# Patient Record
Sex: MALE | Race: BLACK OR AFRICAN AMERICAN | NOT HISPANIC OR LATINO | URBAN - METROPOLITAN AREA
[De-identification: names, ages, dates, MRNs, and addresses within clinical notes are randomized per-mention and may not be internally consistent; named-entity substitution may affect disease eponyms.]

---

## 2023-03-14 ENCOUNTER — EMERGENCY (EMERGENCY)
Facility: HOSPITAL | Age: 38
LOS: 1 days | Discharge: HOME | End: 2023-03-14
Attending: EMERGENCY MEDICINE
Payer: COMMERCIAL

## 2023-03-14 VITALS
OXYGEN SATURATION: 98 % | SYSTOLIC BLOOD PRESSURE: 123 MMHG | DIASTOLIC BLOOD PRESSURE: 69 MMHG | HEIGHT: 65 IN | HEART RATE: 66 BPM | TEMPERATURE: 98 F | RESPIRATION RATE: 16 BRPM | WEIGHT: 169.98 LBS

## 2023-03-14 DIAGNOSIS — F20.9 SCHIZOPHRENIA, UNSPECIFIED: ICD-10-CM

## 2023-03-14 LAB
ALBUMIN SERPL ELPH-MCNC: 4.5 G/DL — SIGNIFICANT CHANGE UP (ref 3.3–5.2)
ALP SERPL-CCNC: 64 U/L — SIGNIFICANT CHANGE UP (ref 40–120)
ALT FLD-CCNC: 20 U/L — SIGNIFICANT CHANGE UP
AMPHET UR-MCNC: NEGATIVE — SIGNIFICANT CHANGE UP
ANION GAP SERPL CALC-SCNC: 10 MMOL/L — SIGNIFICANT CHANGE UP (ref 5–17)
APAP SERPL-MCNC: <3 UG/ML — LOW (ref 10–26)
APPEARANCE UR: CLEAR — SIGNIFICANT CHANGE UP
AST SERPL-CCNC: 22 U/L — SIGNIFICANT CHANGE UP
BACTERIA # UR AUTO: ABNORMAL
BARBITURATES UR SCN-MCNC: NEGATIVE — SIGNIFICANT CHANGE UP
BASOPHILS # BLD AUTO: 0.03 K/UL — SIGNIFICANT CHANGE UP (ref 0–0.2)
BASOPHILS NFR BLD AUTO: 0.6 % — SIGNIFICANT CHANGE UP (ref 0–2)
BENZODIAZ UR-MCNC: NEGATIVE — SIGNIFICANT CHANGE UP
BILIRUB SERPL-MCNC: 0.4 MG/DL — SIGNIFICANT CHANGE UP (ref 0.4–2)
BILIRUB UR-MCNC: NEGATIVE — SIGNIFICANT CHANGE UP
BUN SERPL-MCNC: 11.9 MG/DL — SIGNIFICANT CHANGE UP (ref 8–20)
CALCIUM SERPL-MCNC: 9.4 MG/DL — SIGNIFICANT CHANGE UP (ref 8.4–10.5)
CHLORIDE SERPL-SCNC: 104 MMOL/L — SIGNIFICANT CHANGE UP (ref 96–108)
CO2 SERPL-SCNC: 27 MMOL/L — SIGNIFICANT CHANGE UP (ref 22–29)
COCAINE METAB.OTHER UR-MCNC: NEGATIVE — SIGNIFICANT CHANGE UP
COLOR SPEC: YELLOW — SIGNIFICANT CHANGE UP
CREAT SERPL-MCNC: 1 MG/DL — SIGNIFICANT CHANGE UP (ref 0.5–1.3)
DIFF PNL FLD: NEGATIVE — SIGNIFICANT CHANGE UP
EGFR: 100 ML/MIN/1.73M2 — SIGNIFICANT CHANGE UP
EOSINOPHIL # BLD AUTO: 0.12 K/UL — SIGNIFICANT CHANGE UP (ref 0–0.5)
EOSINOPHIL NFR BLD AUTO: 2.4 % — SIGNIFICANT CHANGE UP (ref 0–6)
EPI CELLS # UR: SIGNIFICANT CHANGE UP
ETHANOL SERPL-MCNC: <10 MG/DL — SIGNIFICANT CHANGE UP (ref 0–9)
GLUCOSE SERPL-MCNC: 85 MG/DL — SIGNIFICANT CHANGE UP (ref 70–99)
GLUCOSE UR QL: NEGATIVE MG/DL — SIGNIFICANT CHANGE UP
HCT VFR BLD CALC: 44.5 % — SIGNIFICANT CHANGE UP (ref 39–50)
HGB BLD-MCNC: 14.8 G/DL — SIGNIFICANT CHANGE UP (ref 13–17)
IMM GRANULOCYTES NFR BLD AUTO: 0.2 % — SIGNIFICANT CHANGE UP (ref 0–0.9)
KETONES UR-MCNC: NEGATIVE — SIGNIFICANT CHANGE UP
LEUKOCYTE ESTERASE UR-ACNC: ABNORMAL
LYMPHOCYTES # BLD AUTO: 1.42 K/UL — SIGNIFICANT CHANGE UP (ref 1–3.3)
LYMPHOCYTES # BLD AUTO: 28.1 % — SIGNIFICANT CHANGE UP (ref 13–44)
MCHC RBC-ENTMCNC: 29.9 PG — SIGNIFICANT CHANGE UP (ref 27–34)
MCHC RBC-ENTMCNC: 33.3 GM/DL — SIGNIFICANT CHANGE UP (ref 32–36)
MCV RBC AUTO: 89.9 FL — SIGNIFICANT CHANGE UP (ref 80–100)
METHADONE UR-MCNC: NEGATIVE — SIGNIFICANT CHANGE UP
MONOCYTES # BLD AUTO: 0.37 K/UL — SIGNIFICANT CHANGE UP (ref 0–0.9)
MONOCYTES NFR BLD AUTO: 7.3 % — SIGNIFICANT CHANGE UP (ref 2–14)
NEUTROPHILS # BLD AUTO: 3.11 K/UL — SIGNIFICANT CHANGE UP (ref 1.8–7.4)
NEUTROPHILS NFR BLD AUTO: 61.4 % — SIGNIFICANT CHANGE UP (ref 43–77)
NITRITE UR-MCNC: NEGATIVE — SIGNIFICANT CHANGE UP
OPIATES UR-MCNC: NEGATIVE — SIGNIFICANT CHANGE UP
PCP SPEC-MCNC: SIGNIFICANT CHANGE UP
PCP UR-MCNC: NEGATIVE — SIGNIFICANT CHANGE UP
PH UR: 6 — SIGNIFICANT CHANGE UP (ref 5–8)
PLATELET # BLD AUTO: 233 K/UL — SIGNIFICANT CHANGE UP (ref 150–400)
POTASSIUM SERPL-MCNC: 3.9 MMOL/L — SIGNIFICANT CHANGE UP (ref 3.5–5.3)
POTASSIUM SERPL-SCNC: 3.9 MMOL/L — SIGNIFICANT CHANGE UP (ref 3.5–5.3)
PROT SERPL-MCNC: 6.9 G/DL — SIGNIFICANT CHANGE UP (ref 6.6–8.7)
PROT UR-MCNC: 15
RBC # BLD: 4.95 M/UL — SIGNIFICANT CHANGE UP (ref 4.2–5.8)
RBC # FLD: 12.4 % — SIGNIFICANT CHANGE UP (ref 10.3–14.5)
RBC CASTS # UR COMP ASSIST: NEGATIVE /HPF — SIGNIFICANT CHANGE UP (ref 0–4)
SALICYLATES SERPL-MCNC: <0.6 MG/DL — LOW (ref 10–20)
SARS-COV-2 RNA SPEC QL NAA+PROBE: SIGNIFICANT CHANGE UP
SODIUM SERPL-SCNC: 141 MMOL/L — SIGNIFICANT CHANGE UP (ref 135–145)
SP GR SPEC: 1.01 — SIGNIFICANT CHANGE UP (ref 1.01–1.02)
THC UR QL: NEGATIVE — SIGNIFICANT CHANGE UP
TSH SERPL-MCNC: 1.3 UIU/ML — SIGNIFICANT CHANGE UP (ref 0.27–4.2)
UROBILINOGEN FLD QL: NEGATIVE MG/DL — SIGNIFICANT CHANGE UP
WBC # BLD: 5.06 K/UL — SIGNIFICANT CHANGE UP (ref 3.8–10.5)
WBC # FLD AUTO: 5.06 K/UL — SIGNIFICANT CHANGE UP (ref 3.8–10.5)
WBC UR QL: SIGNIFICANT CHANGE UP /HPF (ref 0–5)

## 2023-03-14 PROCEDURE — 93010 ELECTROCARDIOGRAM REPORT: CPT

## 2023-03-14 PROCEDURE — 99223 1ST HOSP IP/OBS HIGH 75: CPT

## 2023-03-14 PROCEDURE — 90792 PSYCH DIAG EVAL W/MED SRVCS: CPT

## 2023-03-14 RX ORDER — METFORMIN HYDROCHLORIDE 850 MG/1
1 TABLET ORAL
Qty: 30 | Refills: 0
Start: 2023-03-14 | End: 2023-04-12

## 2023-03-14 RX ORDER — ARIPIPRAZOLE 15 MG/1
20 TABLET ORAL ONCE
Refills: 0 | Status: COMPLETED | OUTPATIENT
Start: 2023-03-14 | End: 2023-03-14

## 2023-03-14 RX ORDER — METFORMIN HYDROCHLORIDE 850 MG/1
500 TABLET ORAL ONCE
Refills: 0 | Status: COMPLETED | OUTPATIENT
Start: 2023-03-14 | End: 2023-03-14

## 2023-03-14 RX ORDER — ARIPIPRAZOLE 15 MG/1
1 TABLET ORAL
Qty: 30 | Refills: 0
Start: 2023-03-14 | End: 2023-04-12

## 2023-03-14 RX ORDER — METFORMIN HYDROCHLORIDE 850 MG/1
500 TABLET ORAL DAILY
Refills: 0 | Status: DISCONTINUED | OUTPATIENT
Start: 2023-03-14 | End: 2023-03-21

## 2023-03-14 RX ORDER — ARIPIPRAZOLE 15 MG/1
20 TABLET ORAL DAILY
Refills: 0 | Status: DISCONTINUED | OUTPATIENT
Start: 2023-03-14 | End: 2023-03-21

## 2023-03-14 RX ADMIN — METFORMIN HYDROCHLORIDE 500 MILLIGRAM(S): 850 TABLET ORAL at 13:06

## 2023-03-14 RX ADMIN — ARIPIPRAZOLE 20 MILLIGRAM(S): 15 TABLET ORAL at 13:06

## 2023-03-14 RX ADMIN — METFORMIN HYDROCHLORIDE 500 MILLIGRAM(S): 850 TABLET ORAL at 21:55

## 2023-03-14 RX ADMIN — ARIPIPRAZOLE 20 MILLIGRAM(S): 15 TABLET ORAL at 22:06

## 2023-03-14 NOTE — ED ADULT NURSE NOTE - ED STAT RN HANDOFF DETAILS
Report given to  RN Kalyan. Pt brought to  interview room with [facility] ambulatory with steady gait. Respirations even & unlabored. NAD. Belongings brought to back by [facility]. Pt made aware of plan of care and verbalized understanding.

## 2023-03-14 NOTE — ED ADULT NURSE REASSESSMENT NOTE - NS ED NURSE REASSESS COMMENT FT1
Endorse patient care at 7:00 PM. Patient in the community area socializing with others. Patient denied visual and auditory hallucination, denied suicidal thoughts, patient wearing bracelet to left foot, patient complies with nursing care, no complains voiced, all needs attend, safety maintained.

## 2023-03-14 NOTE — ED PROVIDER NOTE - CLINICAL SUMMARY MEDICAL DECISION MAKING FREE TEXT BOX
37M hx schizophrenia on ability, diabetes on metformin, presenting for evaluation while occasionally hearing voices, though on further discussion states that "it is not bad", and that he mostly needs medication refills. Does endorse being unable to fill his scripts for >1 month, has been going to emergency departments in [facility] to get doses. On exam does not appear to be responding to internal stimuli, denies SI/HI, no indication currently for 1:1. Will check labs, treat with meds, follow up studies, reassess, dispo.

## 2023-03-14 NOTE — ED PROVIDER NOTE - OBJECTIVE STATEMENT
Hx schizophrenia on abilify 20mg qd historically on haldol long acting injectables though taken off of theses approximately 2 months ago, now w. complaint of not having any medicine refills. Pt had been in [place] over the past couple weeks and was seen and given his meds while there, however unable to obtain them as he didn't have transportation, subsequently when he got back to [place] he needed to come in and get his medicines. Notes he does occasionally hear voices but they are not command, denies SI/HI or visual hallucinations. Denies other sx of concern including fevers, chills, nausea, vomiting.

## 2023-03-14 NOTE — ED CDU PROVIDER INITIAL DAY NOTE - MDM ORDERS SUBMITTED SELECTION
Labs/EKG/Medications Consent (Near Eyelid Margin)/Introductory Paragraph: The rationale for Mohs was explained to the patient and consent was obtained. The risks, benefits and alternatives to therapy were discussed in detail. Specifically, the risks of ectropion or eyelid deformity, infection, scarring, bleeding, prolonged wound healing, incomplete removal, allergy to anesthesia, nerve injury and recurrence were addressed. Prior to the procedure, the treatment site was clearly identified and confirmed by the patient.

## 2023-03-14 NOTE — ED BEHAVIORAL HEALTH ASSESSMENT NOTE - DESCRIPTION
currently homeless, never , with GED h/o diabetes Patient was calm, cooperative, polite and appeared forthcoming. He was not aggressive or agitated.  He was not in any physical distress. He denied any S/H I/I/P.  His behavior was noted to be appropriate in milieu.     ICU Vital Signs Last 24 Hrs  T(C): 36.7 (14 Mar 2023 18:42), Max: 36.9 (14 Mar 2023 16:00)  T(F): 98.1 (14 Mar 2023 18:42), Max: 98.4 (14 Mar 2023 16:00)  HR: 62 (14 Mar 2023 18:42) (62 - 66)  BP: 142/83 (14 Mar 2023 18:42) (104/61 - 142/83)  BP(mean): --  ABP: --  ABP(mean): --  RR: 18 (14 Mar 2023 18:42) (16 - 18)  SpO2: 100% (14 Mar 2023 18:42) (98% - 100%)    O2 Parameters below as of 14 Mar 2023 18:42  Patient On (Oxygen Delivery Method): room air

## 2023-03-14 NOTE — ED PROVIDER NOTE - ATTENDING CONTRIBUTION TO CARE
CK Barnhart: see mdm    I have personally performed a face to face diagnostic evaluation on this patient.  I have reviewed the resident's note and agree with the history, exam, and plan of care, except as noted.   My medical decision making and observations are found above.

## 2023-03-14 NOTE — ED ADULT NURSE REASSESSMENT NOTE - NS ED NURSE REASSESS COMMENT FT1
Patient presented in  area dressed in yellow gowns.  Patient is awake and alert.  Patient pleasant on interaction with a bright affect.  Patient oriented to staff and educated about plan of care including pending consult.  Patient agrees with plan, cooperative with security metal detection and belongings secured in  locker. Patient presented in  area dressed in yellow gowns.  Patient is awake and alert.  Patient pleasant on interaction with a bright affect.  Patient oriented to staff and educated about plan of care including pending consult.  Patient agrees with plan, cooperative with security metal detection and belongings secured in  locker.  Patient has ankle monitor in place on right ankle.

## 2023-03-14 NOTE — ED ADULT NURSE REASSESSMENT NOTE - NS ED NURSE REASSESS COMMENT FT1
Pt given food tray and eating now @ this time. Offers no medical complaints @ this time. Pending  cs. Pt made aware of plan of care and verbalized understanding.

## 2023-03-14 NOTE — ED PROVIDER NOTE - CARE PLAN
1 Principal Discharge DX:	Auditory hallucinations  Secondary Diagnosis:	Nonadherence to medication

## 2023-03-14 NOTE — ED BEHAVIORAL HEALTH ASSESSMENT NOTE - REFERRAL / APPOINTMENT DETAILS
Return to [facility] Plan to hold overnight with dx in the am for placement and to obtain medications. Will also give dose of Abilify today . Return to [facility]

## 2023-03-14 NOTE — ED BEHAVIORAL HEALTH ASSESSMENT NOTE - SAFETY PLAN ADDT'L DETAILS
Safety plan discussed with.../Education provided regarding environmental safety / lethal means restriction/Provision of National Suicide Prevention Lifeline 2-778-820-VQHF (4941)

## 2023-03-14 NOTE — ED PROVIDER NOTE - PHYSICAL EXAMINATION
Gen: NAD, non-toxic, conversational  Eyes: PERRLA, EOMI   HENT: Normocephalic, atraumatic. External ears normal, no rhinorrhea, moist mucous membranes.   CV: RRR, no M/R/G  Resp: CTAB, non-labored, speaking without difficulty on room air  Abd: soft, non tender, non rigid, no guarding or rebound tenderness  Back: No CVAT bilaterally, no midline ttp  Skin: dry, wwp   Neuro: AOx3, speech is fluent and appropriate  Psych: Mood "ok", affect euthymic, denies SI/HI, insight fair, judgement fair

## 2023-03-14 NOTE — ED BEHAVIORAL HEALTH ASSESSMENT NOTE - RISK ASSESSMENT
Low: Patient w/ h/o schizophrenia, recently homeless, hearing voices occasionally, h/o prior hospitalizations, reports prior suicide attempts, denies current S/H I/I/P, seeking medications, denies substance use, not noted to be aggressive, agitated or disorganized, remains future oriented, denies high psychic anxiety

## 2023-03-14 NOTE — ED BEHAVIORAL HEALTH ASSESSMENT NOTE - OTHER PAST PSYCHIATRIC HISTORY (INCLUDE DETAILS REGARDING ONSET, COURSE OF ILLNESS, INPATIENT/OUTPATIENT TREATMENT)
Reports h/o schizophrenia with prior hospitalizations. Reports h/o suicide attempts. States that he last followed with [facility] a little over a month ago and sees Dr. Hopson

## 2023-03-14 NOTE — ED ADULT NURSE REASSESSMENT NOTE - NS ED NURSE REASSESS COMMENT FT1
Patient received all PM medications. Patient tolerates medication. Patient voiced no complain, patient alert and oriented, patient in bed sleeping, safety maintained.

## 2023-03-14 NOTE — ED BEHAVIORAL HEALTH ASSESSMENT NOTE - NSBHMSEBODY_PSY_A_CORE
Average build Protopic Counseling: Patient may experience a mild burning sensation during topical application. Protopic is not approved in children less than 2 years of age. There have been case reports of hematologic and skin malignancies in patients using topical calcineurin inhibitors although causality is questionable.

## 2023-03-14 NOTE — ED ADULT NURSE NOTE - OBJECTIVE STATEMENT
Assumed pt care @1305. Pt received A&Ox4 in yellow gown with belongings secured. Pt states "I was kicked out of my sisters house and have not taken my psych meds in 4 days and now the voices are back." Pt has hx of schizophrenia and DM. Pt is calm and cooperative @ this time. Pt states he is hearing voices, not commanding him to do anything, "just there". Pt denies visual hallucinations, ETOH or illicit drug use. Pt denies SI/HI. Respirations even & unlabored. NAD. No other medical complaints @ this time. Pt safety maintained. Pt made aware of plan of care and verbalized understanding.

## 2023-03-14 NOTE — ED ADULT NURSE REASSESSMENT NOTE - NS ED NURSE REASSESS COMMENT FT1
Pt sleeping comfortably in stretcher, no obvious signs of pain, discomfort or distress @ this time present. Resp even and unlabored. Pt safety maintained. Pending BH cs.

## 2023-03-14 NOTE — ED CDU PROVIDER INITIAL DAY NOTE - OBJECTIVE STATEMENT
Hx schizophrenia on abilify 20mg qd historically on haldol long acting injectables though taken off of theses approximately 2 months ago, now w. complaint of not having any medicine refills. Pt had been in [place] over the past couple weeks and was seen and given his meds while there, however unable to obtain them as he didn't have transportation, subsequently when he got back to [place] he needed to come in and get his medicines. Notes he does occasionally hear voices but they are not command, denies SI/HI or visual hallucinations. Denies other sx of concern including fevers, chills, nausea, vomiting.

## 2023-03-14 NOTE — ED CDU PROVIDER INITIAL DAY NOTE - CLINICAL SUMMARY MEDICAL DECISION MAKING FREE TEXT BOX
37M hx schizophrenia on ability, diabetes on metformin, presenting for evaluation while occasionally hearing voices, though on further discussion states that "it is not bad", and that he mostly needs medication refills. Does endorse being unable to fill his scripts for >1 month, has been going to emergency departments in [facility] to get doses. On exam does not appear to be responding to internal stimuli, denies SI/HI, no indication currently for 1:1. Will check labs, treat with meds, follow up studies, reassess, d/w  for eval of auditory hallucinations though non-command type and from my perspective seems like a good outpt candidate though follow up possibly limited by social determinants of health.     Update: brought back to  for eval, pending recs

## 2023-03-14 NOTE — ED BEHAVIORAL HEALTH ASSESSMENT NOTE - SUMMARY
Patient with h/o schizophrenia, recently homeless, seeking help with housing and refill of medications. Currently in tx at [facility]. He has been calm, polite, no aggression, or agitated noted, Reports he occasionally has been hearing voices but denies CAH and reports that they are not saying anything negative.  Denies any S/H I/I/P, depression or vanessa.  He has ankle bracelet stemming from attempted robbery charges.  No acute psychiatric sx's that would warrant inpatient admission.

## 2023-03-14 NOTE — ED BEHAVIORAL HEALTH ASSESSMENT NOTE - HPI (INCLUDE ILLNESS QUALITY, SEVERITY, DURATION, TIMING, CONTEXT, MODIFYING FACTORS, ASSOCIATED SIGNS AND SYMPTOMS)
Patient is a 37 year old, male;  currently homeless; never ; born in [facility] ([place] age 12), with reported past psychiatric history of schizophrenia, with multiple prior psychiatric  hospitalizations for psychotic sx's; reports prior suicide attempts, current wearing ankle bracelets  due to charges of attempted robbery, reports h/o incarceration, reports in tx at [facility], with Dr. Love; ; no active substance abuse or known history of complicated withdrawal; PMH of diabetes (on Metoromin) ; brought in by EMS; called by self seeking help with housing and prescription for medications.       Patient reports that he has had some conflicts with family and got kicked out of sister's house (in [place]) and then his brother's house in [place] (where he was for two weeks), and he currently has no place to go.  He states that he has had some trouble getting his medications and recently did not take for week. He did last take medications yesterday and ran out.          On interview, patient was calm, polite and appeared forthcoming. He denied feeling depressed. He reports feeling " a little of balance". He has occasionally be hearing voices, but denies CAH and denies that they are making any derogatory statements.   Concerning other, Pt denies any suicidal ideation, intent or plan as well as any aggressive or violent behavior towards others. Pt denies any symptoms  of bizarre happiness, unusual energy, unusual nightime excitation or other common symptoms of vanessa.  No delusions were elicited.  Patient denies pervasive anxiety. He denies that he has been acting in any dangerous manner. Patient states that he has completed his GED and would like to apply to nursing school.

## 2023-03-14 NOTE — ED ADULT TRIAGE NOTE - CHIEF COMPLAINT QUOTE
pt reports "I haven't been able to get my meds and now the voices are back." a and o x3. calm and appropriate. psych history.

## 2023-03-15 VITALS
SYSTOLIC BLOOD PRESSURE: 112 MMHG | TEMPERATURE: 98 F | DIASTOLIC BLOOD PRESSURE: 72 MMHG | HEART RATE: 68 BPM | OXYGEN SATURATION: 98 % | RESPIRATION RATE: 18 BRPM

## 2023-03-15 PROCEDURE — 81001 URINALYSIS AUTO W/SCOPE: CPT

## 2023-03-15 PROCEDURE — 84443 ASSAY THYROID STIM HORMONE: CPT

## 2023-03-15 PROCEDURE — 99284 EMERGENCY DEPT VISIT MOD MDM: CPT | Mod: 25

## 2023-03-15 PROCEDURE — 80053 COMPREHEN METABOLIC PANEL: CPT

## 2023-03-15 PROCEDURE — U0005: CPT

## 2023-03-15 PROCEDURE — 36415 COLL VENOUS BLD VENIPUNCTURE: CPT

## 2023-03-15 PROCEDURE — 99238 HOSP IP/OBS DSCHRG MGMT 30/<: CPT

## 2023-03-15 PROCEDURE — 85025 COMPLETE CBC W/AUTO DIFF WBC: CPT

## 2023-03-15 PROCEDURE — U0003: CPT

## 2023-03-15 PROCEDURE — 93005 ELECTROCARDIOGRAM TRACING: CPT

## 2023-03-15 PROCEDURE — 80307 DRUG TEST PRSMV CHEM ANLYZR: CPT

## 2023-03-15 PROCEDURE — G0378: CPT

## 2023-03-15 RX ORDER — ARIPIPRAZOLE 15 MG/1
1 TABLET ORAL
Qty: 30 | Refills: 0
Start: 2023-03-15 | End: 2023-04-13

## 2023-03-15 RX ADMIN — ARIPIPRAZOLE 20 MILLIGRAM(S): 15 TABLET ORAL at 17:39

## 2023-03-15 RX ADMIN — METFORMIN HYDROCHLORIDE 500 MILLIGRAM(S): 850 TABLET ORAL at 13:27

## 2023-03-15 NOTE — ED ADULT NURSE REASSESSMENT NOTE - NS ED NURSE REASSESS COMMENT FT1
Assumed care of patient at 0715.  Patient resting in bed appears to be sleeping with no distress and regular nonlabored breathing noted.  Safety of patient maintained.

## 2023-03-15 NOTE — ED ADULT NURSE REASSESSMENT NOTE - NS ED NURSE REASSESS COMMENT FT1
Patient in bed sleeping, easy to arouse. Patient tolerates PO fluids and snack, patient ambulates to bathroom as needed, no complain of visual , auditory hallucination, no suicidal thoughts, no evidence of self harm, no attempt to harm others, patient seen and evaluated by psych MD, plan is to discharge patient with  assistance for shelter. Patient meds sent to pharmacy, 30 days supplies provides. All patient needs attend, safety maintained.

## 2023-03-15 NOTE — ED ADULT NURSE REASSESSMENT NOTE - NS ED NURSE REASSESS COMMENT FT1
Patient denies suicidal or homicidal ideations.  Patient reviewed discharge instructions and provided a copy of instructions.  Patient agrees to return to local ED if symptoms worsen or thoughts to harm self or others develop.

## 2023-03-15 NOTE — ED ADULT NURSE REASSESSMENT NOTE - NS ED NURSE REASSESS COMMENT FT1
Patient ate 100% of his breakfast and lunch.  Patient denies suicidal or homicidal ideations.  Patient is awaiting  assistance for discharge.  Safety of patient maintained.

## 2023-03-15 NOTE — ED ADULT NURSE REASSESSMENT NOTE - GENERAL PATIENT STATE
comfortable appearance/cooperative
comfortable appearance/resting/sleeping
comfortable appearance/cooperative
comfortable appearance/no change observed

## 2023-03-15 NOTE — ED CDU PROVIDER DISPOSITION NOTE - PATIENT PORTAL LINK FT
You can access the FollowMyHealth Patient Portal offered by [facility] by registering at the following website: [email or web address]. By joining [employer]’s FollowMyHealth portal, you will also be able to view your health information using other applications (apps) compatible with our system.

## 2023-03-15 NOTE — ED ADULT NURSE REASSESSMENT NOTE - COMFORT CARE
darkened lights
plan of care explained
meal provided/plan of care explained
ambulated to bathroom/meal provided/plan of care explained/po fluids offered

## 2023-03-15 NOTE — CHART NOTE - NSCHARTNOTEFT_GEN_A_CORE
Josselyn: pt T&R to go in the am, to p/u meds at [facility] . Will need [facility] housing ,to call [facility] to confirm appt . SW to follow in the am . Pt has insurance in place ([facility]).
10:45SW Note: p/c to pt's kompany ( [facility]) to confirm pt has an appt in place, message left in her voicemail.  13:37 P/c to Love no answer ,left another voicemail to  back asap.   14:15 Worker called kompany program  manager (Martina ext [phone]) message left. SW to follow.
MYRA Note: Per behavioral team pt is T&R, needs follow up appts in place for D/C. Pt follows w/ [facility] in [place]. MYRA placed call and spoke to  ([phone]) at [place], was informed pt has no future appts scheduled at this time, transferred worker to pt's YAN Aleman to schedule future appt, VM left for Parul.    MYRA received call back from Parul stating pt can come to [place] for appt tomorrow and she will set up transport for pt to come, MYRA explained pt is not welcome back at [facility], may be staying in [facility] this evening. Per Parul, VM can be left for her afterhours w/ address pt is going to and she will f/u with pt tomorrow about getting to [place] for follow up appt. SW to follow
SWNote: p/c to DSS housing ([facility]),shelter requested. Placement found at [address] [facility] . [place] taxi needed, [facility] called ([place]) taxi arranged with [place] taxi ETA 25 min. Reserv#[id number]. Pt understands to call his [facility]  (tammy) to coordinate for tomorrow's appt ,also to call behavioral unit to inform staff what pharmacy he will like Dr Powell to send prescription. Verbalized understanding. No other SW needs reported at this moment.

## 2023-04-28 ENCOUNTER — EMERGENCY (EMERGENCY)
Facility: HOSPITAL | Age: 38
LOS: 1 days | Discharge: OTHER INSTITUTION | End: 2023-04-28
Attending: EMERGENCY MEDICINE
Payer: COMMERCIAL

## 2023-04-28 VITALS
TEMPERATURE: 98 F | SYSTOLIC BLOOD PRESSURE: 131 MMHG | OXYGEN SATURATION: 98 % | HEART RATE: 56 BPM | RESPIRATION RATE: 16 BRPM | DIASTOLIC BLOOD PRESSURE: 85 MMHG | HEIGHT: 65 IN | WEIGHT: 169.98 LBS

## 2023-04-28 VITALS
TEMPERATURE: 98 F | DIASTOLIC BLOOD PRESSURE: 79 MMHG | SYSTOLIC BLOOD PRESSURE: 109 MMHG | RESPIRATION RATE: 18 BRPM | HEART RATE: 86 BPM | OXYGEN SATURATION: 98 %

## 2023-04-28 DIAGNOSIS — F20.9 SCHIZOPHRENIA, UNSPECIFIED: ICD-10-CM

## 2023-04-28 LAB
ALBUMIN SERPL ELPH-MCNC: 4.1 G/DL — SIGNIFICANT CHANGE UP (ref 3.3–5.2)
ALP SERPL-CCNC: 59 U/L — SIGNIFICANT CHANGE UP (ref 40–120)
ALT FLD-CCNC: 18 U/L — SIGNIFICANT CHANGE UP
AMPHET UR-MCNC: NEGATIVE — SIGNIFICANT CHANGE UP
ANION GAP SERPL CALC-SCNC: 8 MMOL/L — SIGNIFICANT CHANGE UP (ref 5–17)
APAP SERPL-MCNC: <3 UG/ML — LOW (ref 10–26)
APPEARANCE UR: CLEAR — SIGNIFICANT CHANGE UP
AST SERPL-CCNC: 21 U/L — SIGNIFICANT CHANGE UP
BARBITURATES UR SCN-MCNC: NEGATIVE — SIGNIFICANT CHANGE UP
BASOPHILS # BLD AUTO: 0.03 K/UL — SIGNIFICANT CHANGE UP (ref 0–0.2)
BASOPHILS NFR BLD AUTO: 0.7 % — SIGNIFICANT CHANGE UP (ref 0–2)
BENZODIAZ UR-MCNC: NEGATIVE — SIGNIFICANT CHANGE UP
BILIRUB SERPL-MCNC: <0.2 MG/DL — LOW (ref 0.4–2)
BILIRUB UR-MCNC: NEGATIVE — SIGNIFICANT CHANGE UP
BUN SERPL-MCNC: 11.1 MG/DL — SIGNIFICANT CHANGE UP (ref 8–20)
CALCIUM SERPL-MCNC: 9.3 MG/DL — SIGNIFICANT CHANGE UP (ref 8.4–10.5)
CHLORIDE SERPL-SCNC: 102 MMOL/L — SIGNIFICANT CHANGE UP (ref 96–108)
CO2 SERPL-SCNC: 26 MMOL/L — SIGNIFICANT CHANGE UP (ref 22–29)
COCAINE METAB.OTHER UR-MCNC: NEGATIVE — SIGNIFICANT CHANGE UP
COLOR SPEC: YELLOW — SIGNIFICANT CHANGE UP
CREAT SERPL-MCNC: 0.86 MG/DL — SIGNIFICANT CHANGE UP (ref 0.5–1.3)
DIFF PNL FLD: NEGATIVE — SIGNIFICANT CHANGE UP
EGFR: 115 ML/MIN/1.73M2 — SIGNIFICANT CHANGE UP
EOSINOPHIL # BLD AUTO: 0.21 K/UL — SIGNIFICANT CHANGE UP (ref 0–0.5)
EOSINOPHIL NFR BLD AUTO: 5.1 % — SIGNIFICANT CHANGE UP (ref 0–6)
ETHANOL SERPL-MCNC: <10 MG/DL — SIGNIFICANT CHANGE UP (ref 0–9)
GLUCOSE SERPL-MCNC: 95 MG/DL — SIGNIFICANT CHANGE UP (ref 70–99)
GLUCOSE UR QL: NEGATIVE MG/DL — SIGNIFICANT CHANGE UP
HCT VFR BLD CALC: 43.2 % — SIGNIFICANT CHANGE UP (ref 39–50)
HGB BLD-MCNC: 14.5 G/DL — SIGNIFICANT CHANGE UP (ref 13–17)
IMM GRANULOCYTES NFR BLD AUTO: 0.2 % — SIGNIFICANT CHANGE UP (ref 0–0.9)
KETONES UR-MCNC: NEGATIVE — SIGNIFICANT CHANGE UP
LEUKOCYTE ESTERASE UR-ACNC: NEGATIVE — SIGNIFICANT CHANGE UP
LYMPHOCYTES # BLD AUTO: 1.02 K/UL — SIGNIFICANT CHANGE UP (ref 1–3.3)
LYMPHOCYTES # BLD AUTO: 24.7 % — SIGNIFICANT CHANGE UP (ref 13–44)
MCHC RBC-ENTMCNC: 29.8 PG — SIGNIFICANT CHANGE UP (ref 27–34)
MCHC RBC-ENTMCNC: 33.6 GM/DL — SIGNIFICANT CHANGE UP (ref 32–36)
MCV RBC AUTO: 88.9 FL — SIGNIFICANT CHANGE UP (ref 80–100)
METHADONE UR-MCNC: NEGATIVE — SIGNIFICANT CHANGE UP
MONOCYTES # BLD AUTO: 0.33 K/UL — SIGNIFICANT CHANGE UP (ref 0–0.9)
MONOCYTES NFR BLD AUTO: 8 % — SIGNIFICANT CHANGE UP (ref 2–14)
NEUTROPHILS # BLD AUTO: 2.53 K/UL — SIGNIFICANT CHANGE UP (ref 1.8–7.4)
NEUTROPHILS NFR BLD AUTO: 61.3 % — SIGNIFICANT CHANGE UP (ref 43–77)
NITRITE UR-MCNC: NEGATIVE — SIGNIFICANT CHANGE UP
OPIATES UR-MCNC: NEGATIVE — SIGNIFICANT CHANGE UP
PCP SPEC-MCNC: SIGNIFICANT CHANGE UP
PCP UR-MCNC: NEGATIVE — SIGNIFICANT CHANGE UP
PH UR: 7 — SIGNIFICANT CHANGE UP (ref 5–8)
PLATELET # BLD AUTO: 221 K/UL — SIGNIFICANT CHANGE UP (ref 150–400)
POTASSIUM SERPL-MCNC: 3.9 MMOL/L — SIGNIFICANT CHANGE UP (ref 3.5–5.3)
POTASSIUM SERPL-SCNC: 3.9 MMOL/L — SIGNIFICANT CHANGE UP (ref 3.5–5.3)
PROT SERPL-MCNC: 6.6 G/DL — SIGNIFICANT CHANGE UP (ref 6.6–8.7)
PROT UR-MCNC: NEGATIVE — SIGNIFICANT CHANGE UP
RBC # BLD: 4.86 M/UL — SIGNIFICANT CHANGE UP (ref 4.2–5.8)
RBC # FLD: 12.6 % — SIGNIFICANT CHANGE UP (ref 10.3–14.5)
SALICYLATES SERPL-MCNC: <0.6 MG/DL — LOW (ref 10–20)
SARS-COV-2 RNA SPEC QL NAA+PROBE: SIGNIFICANT CHANGE UP
SODIUM SERPL-SCNC: 136 MMOL/L — SIGNIFICANT CHANGE UP (ref 135–145)
SP GR SPEC: 1 — LOW (ref 1.01–1.02)
THC UR QL: NEGATIVE — SIGNIFICANT CHANGE UP
UROBILINOGEN FLD QL: NEGATIVE MG/DL — SIGNIFICANT CHANGE UP
WBC # BLD: 4.13 K/UL — SIGNIFICANT CHANGE UP (ref 3.8–10.5)
WBC # FLD AUTO: 4.13 K/UL — SIGNIFICANT CHANGE UP (ref 3.8–10.5)

## 2023-04-28 PROCEDURE — 90792 PSYCH DIAG EVAL W/MED SRVCS: CPT

## 2023-04-28 PROCEDURE — 80053 COMPREHEN METABOLIC PANEL: CPT

## 2023-04-28 PROCEDURE — 93010 ELECTROCARDIOGRAM REPORT: CPT

## 2023-04-28 PROCEDURE — G0378: CPT

## 2023-04-28 PROCEDURE — U0003: CPT

## 2023-04-28 PROCEDURE — U0005: CPT

## 2023-04-28 PROCEDURE — 99283 EMERGENCY DEPT VISIT LOW MDM: CPT | Mod: 25

## 2023-04-28 PROCEDURE — 85025 COMPLETE CBC W/AUTO DIFF WBC: CPT

## 2023-04-28 PROCEDURE — 80307 DRUG TEST PRSMV CHEM ANLYZR: CPT

## 2023-04-28 PROCEDURE — 36415 COLL VENOUS BLD VENIPUNCTURE: CPT

## 2023-04-28 PROCEDURE — 81003 URINALYSIS AUTO W/O SCOPE: CPT

## 2023-04-28 PROCEDURE — 99223 1ST HOSP IP/OBS HIGH 75: CPT

## 2023-04-28 PROCEDURE — 93005 ELECTROCARDIOGRAM TRACING: CPT

## 2023-04-28 RX ORDER — ARIPIPRAZOLE 15 MG/1
15 TABLET ORAL AT BEDTIME
Refills: 0 | Status: DISCONTINUED | OUTPATIENT
Start: 2023-04-28 | End: 2023-05-05

## 2023-04-28 RX ORDER — METFORMIN HYDROCHLORIDE 850 MG/1
500 TABLET ORAL DAILY
Refills: 0 | Status: DISCONTINUED | OUTPATIENT
Start: 2023-04-28 | End: 2023-05-05

## 2023-04-28 RX ADMIN — METFORMIN HYDROCHLORIDE 500 MILLIGRAM(S): 850 TABLET ORAL at 17:29

## 2023-04-28 NOTE — CHART NOTE - NSCHARTNOTEFT_GEN_A_CORE
SWNote: pt seen by behavioral provider , pt needs transfer 9.13 status. Worker called [facility] ,bed available .Case to be reviewed ,worker will be called with review' outcome. legals EKG to be faxed asap. SW to follow. SWNote: pt seen by behavioral NP(Alessandra) , pt needs transfer 9.13 status. Worker called [facility] ,bed available .Case to be reviewed ,worker will be called with review' outcome. legals EKG to be faxed asap. SW to follow.

## 2023-04-28 NOTE — ED CDU PROVIDER INITIAL DAY NOTE - OBJECTIVE STATEMENT
36 yo male hx of schizophrenia and diabetes presents with SI; patient recently had inpatient stay at [facility]. Currently supposed to be on Abilify and Metformin; has been off meds for approx 3-5 days due to moving, and being without meds. States he now feels restless and thinking about jumping in front of a train. No other somatic complaints.

## 2023-04-28 NOTE — ED BEHAVIORAL HEALTH ASSESSMENT NOTE - NSACTIVEVENT_PSY_ALL_CORE
Legal problems Triggering events leading to humiliation, shame, and/or despair (e.g., Loss of relationship, financial or health status) (real or anticipated)/Legal problems

## 2023-04-28 NOTE — ED BEHAVIORAL HEALTH ASSESSMENT NOTE - VIOLENCE RISK FACTORS:
Impulsivity/Noncompliance with treatment/Community stressors that increase the risk of destabilization

## 2023-04-28 NOTE — ED BEHAVIORAL HEALTH ASSESSMENT NOTE - ADDITIONAL DETAILS ALL
has tried to self harm but has been stopped, interrupted attempts for suicide, no active intent/plan but has passive ideation

## 2023-04-28 NOTE — ED CDU PROVIDER INITIAL DAY NOTE - CROS ED ROS STATEMENT
Medicare Wellness Visit patient outreach outcome:  Attempted outreach and message left     [facility] Health Assistant   [phone]  
all other ROS negative except as per HPI

## 2023-04-28 NOTE — ED BEHAVIORAL HEALTH ASSESSMENT NOTE - CURRENT MEDICATION
None, has not received Metformin for the last 3 days Metformin 500 hs, Abilify 20 hs (none for 3 days)

## 2023-04-28 NOTE — ED BEHAVIORAL HEALTH ASSESSMENT NOTE - DESCRIPTION
DM Patient was calm and cooperative in the ED and did not exhibit any aggression. Pt did not require any prn medications or any physical restraints. 37 year old, male;  currently homeless; never ; born in [place] ([place] age 12), with reported past psychiatric history of schizophrenia, with multiple prior psychiatric  hospitalizations for psychotic sx's; reports prior suicide attempts, current wearing ankle bracelets  due to charges of attempted robbery, reports h/o incarceration, reports in tx at [facility], with Dr. Love; ; no active substance abuse or known history of complicated withdrawal;

## 2023-04-28 NOTE — ED ADULT TRIAGE NOTE - CHIEF COMPLAINT QUOTE
pt states he has been off his abilify x3 days, having thoughts of jumping in front of a train today.  Patient undressed and placed in yellow gown, belongings secured.

## 2023-04-28 NOTE — CHART NOTE - NSCHARTNOTEFT_GEN_A_CORE
MYRANotolga: p/c from Lucila at [facility] to inform worker  pt accepted by Dr Henriquez 9.13 status. Transfer will need auth([facility]) ,SW to follow asap . [facility] called(               ) darell RENEE first available. MYRANote: p/c from Lucila at [facility] to inform worker  pt accepted by Dr Henriquez 9.13 status. Transfer will need auth([facility]) ,SW to follow asap . [facility] called([facility] ) darell RENEE first available. NEAF/billing letter and package left at behavioral counter .

## 2023-04-28 NOTE — ED BEHAVIORAL HEALTH ASSESSMENT NOTE - LEGAL HISTORY
jazzy wearing ankle bracelets  due to charges of attempted robbery, reports h/o incarceration, reports in tx at [facility], with Dr. Love currently wearing ankle bracelets  due to charges of attempted robbery, reports h/o incarceration, reports in tx at [facility], with Dr. Love

## 2023-04-28 NOTE — ED PROVIDER NOTE - PHYSICAL EXAMINATION
Const: Awake, alert and oriented. In no acute distress. Well appearing.  HEENT: NC/AT. Moist mucous membranes.  Eyes: No scleral icterus. EOMI.  Neck:. Soft and supple. Full ROM without pain.  Cardiac: Regular rate and rhythm. +S1/S2. No murmurs. Peripheral pulses 2+ and symmetric. No LE edema.  Resp: Speaking in full sentences. No evidence of respiratory distress. No wheezes, rales or rhonchi.  Abd: Soft, non-tender, non-distended. Normal bowel sounds in all 4 quadrants. No guarding or rebound.  Back: Spine midline and non-tender. No CVAT.  Neuro: no gross deficits, moving all extremities, normal speech  Psych: +SI

## 2023-04-28 NOTE — ED ADULT NURSE NOTE - OBJECTIVE STATEMENT
Patient presented in  area dressed in yellow gowns.  Patient is awake and alert, oriented times four.  Patient reports he developed suicidal ideations but come to the hospital for help before a plan developed.  Patient reports he has been not taking his medications as prescribed because he packeted them away with a planned move to [place] and unable to locate them.  Patient has an ankle monitor on due to attempted robbery charge which he reports will be removed soon.  Patient denies thoughts to harm self or others.  Patient denies any drug or ETOH use.  Cooperative with security contraband assessment.

## 2023-04-28 NOTE — ED BEHAVIORAL HEALTH ASSESSMENT NOTE - HPI (INCLUDE ILLNESS QUALITY, SEVERITY, DURATION, TIMING, CONTEXT, MODIFYING FACTORS, ASSOCIATED SIGNS AND SYMPTOMS)
Patient is a 37 year old, male; domiciled at sister's house in [place] with his sister, brother in law, niece and nephew, single; non-caregiver; unemployed; past psychiatric history of schizophrenia; multiple psychiatric hospitalizations (about 10 he states, most recently at [facility], discharged a week ago); multiple suicide attempts as per patient (hanging, trying to jump in front of cars/train); history of arrest with ankle monitor for attempted robbery; no active substance abuse or known history of complicated withdrawal; PMH of DM; brought in by EMS presenting with feelings of depression and wanting to hurt himself, also stating that his sister's house is "haunted" and that it's "not safe there". He denies history of abuse, denies substance use at all, denies follow up with psych as an outpatient, denies access to firearms, denies somatic symptoms and denies wanting us to get in contact with his brother in law about medications because the "blame would fall on him".     Upon assessment, patient is calm and cooperative, "getting rest" in bed. Patient states that he was feeling depressed recently and has not been taking his medication, causing him to have suicidal ideation, without intent or plan at this time. He states that he was recently discharged from [facility] and has been staying at his sister's house, however, he believes that it's haunted and he states he feels things crawling on him, he is seeing ghosts there and he feels unsafe. He hears voices "all the time", they tell him to "pray" and "shower" but he only hears the "bad voices" at his sister's house. He states that he has been hospitalized psychiatrically multiple times, around 10, for seeing things/hearing things and SI. He is typically prescribed Metformin and Abilify but he last received injectable Abilify at [facility] and states that his brother is withholding his medications or he misplaced it, he's unsure. He states that he has a meeting with a  soon and is on house arrest, they "should" be removing his ankle monitor and then he wants to go to [place] once everything is clear legally. He denies history of self harm, although he states he has tried before and been stopped by his family. His biggest concern is his own safety in his sister's house (2/2 hearing voices and seeing ghosts), He has not been eating or sleeping as he normally does at his sister's house, he says 2 hours at the most. Patient has very strong akash and states he is a "Godly" man and he is giving him the strength to continue and move to [place] once legally cleared. He states he has a family history of psychiatric issues, his brother has bipolar and schizophrenia and his uncle does also. Patient states that he wants to be admitted and reinstated on his medication.     Collateral: Nina (Aunt, [phone]). She states she did not know patient is in the hospital, however, she knows that he is a "sick person". She states that he called her recently and told her that he was having trouble breathing. She knew that he had some psychiatric history but did not know that he has been recently admitted to psychiatric facilities. She notes that he has been staying at his sister's house and that she would have more information but does not want to provide her number. Other than what was already stated, she did not have anything further to disclose to the writer. Patient is a 37 year old, male; domiciled at sister's house in [place] with his sister, brother in law, niece and nephew, single; non-caregiver; unemployed; past psychiatric history of schizophrenia; multiple psychiatric hospitalizations (about 10 he states, most recently at [facility], discharged a week ago); multiple suicide attempts as per patient (hanging, trying to jump in front of cars/train); history of arrest with ankle monitor for attempted robbery; no active substance abuse or known history of complicated withdrawal; PMH of DM; brought in by EMS presenting with feelings of depression and wanting to hurt himself, also stating that his sister's house is "haunted" and that it's "not safe there". He denies history of abuse, denies substance use at all, denies follow up with psych as an outpatient, denies access to firearms, denies somatic symptoms and denies wanting us to get in contact with his brother in law about medications because the "blame would fall on him".     Upon assessment, patient is calm and cooperative, "getting rest" in bed. Patient states that he was feeling depressed recently and has not been taking his medication, causing him to have suicidal ideation, without intent or plan at this time. He states that he was recently discharged from [facility] and has been staying at his sister's house, however, he believes that it's haunted and he states he feels things crawling on him, he is seeing ghosts there and he feels unsafe. He hears voices "all the time", they tell him to "pray" and "shower" but he only hears the "bad voices" at his sister's house. He states that he has been hospitalized psychiatrically multiple times, around 10, for seeing things/hearing things and SI. He is typically prescribed Metformin and Abilify but he last received injectable Abilify at [facility] and states that his brother is withholding his medications or he misplaced it, he's unsure. He states that he has a meeting with a  soon and is on house arrest, they "should" be removing his ankle monitor and then he wants to go to [place] once everything is clear legally. He denies history of cutting, although he states he has tried before and been stopped by his family. His biggest concern is his own safety in his sister's house (2/2 hearing voices and seeing ghosts), He has not been eating or sleeping as he normally does at his sister's house, he says 2 hours at the most. Patient has very strong akash and states he is a "Godly" man and he is giving him the strength to continue and move to [place] once legally cleared. He states he has a family history of psychiatric issues, his brother has bipolar and schizophrenia and his uncle does also. Patient states that he wants to be admitted and reinstated on his medication.     Collateral: Nina (Aunt, [phone]). She states she did not know patient is in the hospital, however, she knows that he is a "sick person". She states that he called her recently and told her that he was having trouble breathing. She knew that he had some psychiatric history but did not know that he has been recently admitted to psychiatric facilities. She notes that he has been staying at his sister's house and that she would have more information but does not want to provide her number. Other than what was already stated, she did not have anything further to disclose to the writer.

## 2023-04-28 NOTE — ED BEHAVIORAL HEALTH ASSESSMENT NOTE - SUICIDAL BEHAVIOR DETAILS
no active intent or plan, however continues to hear voices and see things, he feels unsafe and continues to have SI

## 2023-04-28 NOTE — ED BEHAVIORAL HEALTH ASSESSMENT NOTE - SUMMARY
Patient is a 37 year old, male; domiciled at sister's house in [place] with his sister, brother in law, niece and nephew, single; non-caregiver; unemployed; past psychiatric history of schizophrenia; multiple psychiatric hospitalizations (about 10 he states, most recently at [facility], discharged a week ago); multiple suicide attempts as per patient (hanging, trying to jump in front of cars/train); history of arrest with ankle monitor for attempted robbery; no active substance abuse or known history of complicated withdrawal; PMH of DM; brought in by EMS presenting with feelings of depression and wanting to hurt himself, also stating that his sister's house is "haunted" and that it's "not safe there". He denies history of abuse, denies substance use at all, denies follow up with psych as an outpatient, denies access to firearms, denies somatic symptoms and denies wanting us to get in contact with his brother in law about medications because the "blame would fall on him".     Upon assessment, patient is calm and cooperative, "getting rest" in bed. Patient states that he was feeling depressed recently and has not been taking his medication, causing him to have suicidal ideation, without intent or plan at this time. He states that he was recently discharged from [facility] and has been staying at his sister's house, however, he believes that it's haunted and he states he feels things crawling on him, he is seeing ghosts there and he feels unsafe. He continues to hear voices and see ghosts, patient is seeking admission for his own safety. He states that he will be out of ankle monitor soon and be done with prior legal troubles and will go to [facility] for a new start. Patient is seeking voluntary admission under legal code status 9.13. current weight

## 2023-04-28 NOTE — ED ADULT NURSE REASSESSMENT NOTE - NS ED NURSE REASSESS COMMENT FT1
Patient continues to endorse feeling depressed.  Patient denies plan or intent to harm himself and agrees with plan to transfer inpatient when a bed is available.  Patient notified his mother of pending transfer via hospital phone.  Socializing appropriately with select peers.  No attempts to harm self or others and safety maintained.  Ankle monitoring bracelet  secure on right ankle.  Safety of patient maintained.

## 2023-04-28 NOTE — ED BEHAVIORAL HEALTH ASSESSMENT NOTE - DETAILS
Was discharged from [facility] less than one week ago was at this hospital one month ago and was treated and released pending yes Haldol allergy - "tongue feels twisted" brother has schizophrenia and bipolar, uncle also suffered but he's not sure from what to pray and take a shower, only bad when he goes to sister's house multiple suicide attempts in the past as per patient, hanging or jumping in front of cars/trains. He states that's what he would do if he was going to try again but has no active intent or plan

## 2023-04-28 NOTE — ED PROVIDER NOTE - OBJECTIVE STATEMENT
38 yo male hx of schizophrenia and diabetes presents with SI; patient recently had inpatient stay at [facility]. Currently supposed to be on Abilify and Metformin; has been off meds for approx 3-5 days due to moving, and being without meds. States he now feels restless and thinking about jumping in front of a train. No other somatic complaints.

## 2023-04-28 NOTE — ED BEHAVIORAL HEALTH ASSESSMENT NOTE - NSBHATTESTAPPBILLTIME_PSY_A_CORE
I attest my time as RASHEEDA is greater than 50% of the total combined time spent on qualifying patient care activities. I have reviewed and verified the documentation.

## 2023-04-28 NOTE — ED BEHAVIORAL HEALTH ASSESSMENT NOTE - NSTXRELFACTOR_PSY_ALL_CORE
discharged from [facility] last week/Non-compliant or not receiving treatment/Recent inpatient discharge

## 2023-04-28 NOTE — ED BEHAVIORAL HEALTH ASSESSMENT NOTE - OTHER PAST PSYCHIATRIC HISTORY (INCLUDE DETAILS REGARDING ONSET, COURSE OF ILLNESS, INPATIENT/OUTPATIENT TREATMENT)
several inpatient psychiatric admissions for psychosis (hearing voices, seeing things and SI), recent legal troubles (attempted robbery) several inpatient psychiatric admissions for psychosis (hearing voices, seeing things and SI)  Last admission [facility]
